# Patient Record
Sex: MALE
[De-identification: names, ages, dates, MRNs, and addresses within clinical notes are randomized per-mention and may not be internally consistent; named-entity substitution may affect disease eponyms.]

---

## 2018-09-20 ENCOUNTER — RX ONLY (RX ONLY)
Age: 58
End: 2018-09-20

## 2018-09-20 RX ORDER — AZITHROMYCIN 250 MG/1
TABLET, FILM COATED ORAL
Qty: 6 | Refills: 1 | Status: ERX | COMMUNITY
Start: 2018-09-20

## 2018-11-07 ENCOUNTER — RX ONLY (RX ONLY)
Age: 58
End: 2018-11-07

## 2018-11-07 RX ORDER — BUDESONIDE/FORMOTEROL FUMARATE 80-4.5 MCG
HFA AEROSOL WITH ADAPTER (GRAM) INHALATION
Qty: 1 | Refills: 11 | Status: ERX | COMMUNITY
Start: 2018-11-07

## 2019-09-19 ENCOUNTER — RX ONLY (RX ONLY)
Age: 59
End: 2019-09-19

## 2019-09-19 RX ORDER — GLYBURIDE 2.5 MG/1
TABLET ORAL
Qty: 90 | Refills: 0 | Status: CANCELLED
Stop reason: CLARIF

## 2019-11-18 ENCOUNTER — RX ONLY (RX ONLY)
Age: 59
End: 2019-11-18

## 2019-11-18 RX ORDER — METFORMIN HYDROCHLORIDE 500 MG/1
TABLET, FILM COATED ORAL
Qty: 120 | Refills: 5 | Status: ERX | COMMUNITY
Start: 2019-11-18

## 2020-02-17 ENCOUNTER — RX ONLY (RX ONLY)
Age: 60
End: 2020-02-17

## 2020-07-13 ENCOUNTER — APPOINTMENT (OUTPATIENT)
Dept: RURAL CLINIC 10 | Age: 60
Setting detail: DERMATOLOGY
End: 2020-07-13

## 2020-07-13 DIAGNOSIS — E11.9 TYPE 2 DIABETES MELLITUS WITHOUT COMPLICATIONS: ICD-10-CM

## 2020-07-13 PROCEDURE — 36415 COLL VENOUS BLD VENIPUNCTURE: CPT

## 2020-07-13 PROCEDURE — OTHER ORDER TESTS: OTHER

## 2020-07-13 PROCEDURE — OTHER VENIPUNCTURE: OTHER

## 2020-07-18 ENCOUNTER — RX ONLY (RX ONLY)
Age: 60
End: 2020-07-18

## 2020-07-18 RX ORDER — GLYBURIDE 2.5 MG/1
TABLET ORAL
Qty: 90 | Refills: 5 | Status: ERX | COMMUNITY
Start: 2020-07-18

## 2020-07-18 RX ORDER — METFORMIN HYDROCHLORIDE 500 MG/1
TABLET, FILM COATED ORAL
Qty: 120 | Refills: 5 | Status: ERX | COMMUNITY
Start: 2020-07-18

## 2020-07-27 ENCOUNTER — RX ONLY (RX ONLY)
Age: 60
End: 2020-07-27

## 2020-07-27 RX ORDER — SULFAMETHOXAZOLE AND TRIMETHOPRIM 800; 160 MG/1; MG/1
TABLET ORAL
Qty: 20 | Refills: 0 | Status: ERX | COMMUNITY
Start: 2020-07-27

## 2020-10-02 ENCOUNTER — RX ONLY (RX ONLY)
Age: 60
End: 2020-10-02

## 2020-10-02 RX ORDER — AMOXICILLIN AND CLAVULANATE POTASSIUM 875; 125 MG/1; 1/1
TABLET, FILM COATED ORAL
Qty: 28 | Refills: 0 | Status: ERX | COMMUNITY
Start: 2020-10-02

## 2021-01-26 ENCOUNTER — RX ONLY (RX ONLY)
Age: 61
End: 2021-01-26

## 2021-01-26 RX ORDER — METFORMIN HYDROCHLORIDE 500 MG/1
TABLET, FILM COATED ORAL
Qty: 120 | Refills: 2 | Status: ERX

## 2021-04-25 ENCOUNTER — RX ONLY (RX ONLY)
Age: 61
End: 2021-04-25

## 2021-04-25 RX ORDER — GLYBURIDE 2.5 MG/1
TABLET ORAL
Qty: 90 | Refills: 0 | Status: ERX

## 2021-04-25 RX ORDER — LISINOPRIL 20 MG/1
TABLET ORAL
Qty: 30 | Refills: 0 | Status: ERX

## 2021-04-26 ENCOUNTER — RX ONLY (RX ONLY)
Age: 61
End: 2021-04-26

## 2021-04-26 RX ORDER — GLYBURIDE 2.5 MG/1
TABLET ORAL
Qty: 90 | Refills: 0 | Status: ERX

## 2021-04-26 RX ORDER — LISINOPRIL 20 MG/1
TABLET ORAL
Qty: 30 | Refills: 0 | Status: ERX | COMMUNITY
Start: 2021-04-26

## 2021-05-13 ENCOUNTER — APPOINTMENT (OUTPATIENT)
Age: 61
Setting detail: DERMATOLOGY
End: 2021-05-13

## 2021-05-13 DIAGNOSIS — E11.9 TYPE 2 DIABETES MELLITUS WITHOUT COMPLICATIONS: ICD-10-CM

## 2021-05-13 PROCEDURE — OTHER ORDER TESTS: OTHER

## 2021-05-13 PROCEDURE — OTHER VENIPUNCTURE: OTHER

## 2021-05-13 NOTE — PROCEDURE: VENIPUNCTURE
Venipuncture Paragraph: An alcohol pad was applied to the venipuncture site. Venipuncture was performed using a butterfly needle. Pressure and a bandaid was applied to the site. No complications were noted.
Detail Level: None
Bill [id number] For Specimen Handling/Conveyance To Laboratory?: no
Number Of Tubes Drawn: 2

## 2021-05-16 ENCOUNTER — RX ONLY (RX ONLY)
Age: 61
End: 2021-05-16

## 2021-05-16 RX ORDER — LISINOPRIL 20 MG/1
TABLET ORAL
Qty: 30 | Refills: 5 | Status: ERX

## 2021-05-16 RX ORDER — GLYBURIDE 2.5 MG/1
TABLET ORAL
Qty: 90 | Refills: 5 | Status: ERX

## 2021-05-16 RX ORDER — METFORMIN HYDROCHLORIDE 500 MG/1
TABLET, FILM COATED ORAL
Qty: 120 | Refills: 5 | Status: ERX

## 2021-06-01 ENCOUNTER — RX ONLY (RX ONLY)
Age: 61
End: 2021-06-01

## 2021-06-01 RX ORDER — AZITHROMYCIN MONOHYDRATE 250 MG/1
TABLET, FILM COATED ORAL
Qty: 6 | Refills: 0 | Status: ERX | COMMUNITY
Start: 2021-06-01

## 2021-06-21 ENCOUNTER — RX ONLY (RX ONLY)
Age: 61
End: 2021-06-21

## 2021-06-21 RX ORDER — LOSARTAN POTASSIUM 50 MG/1
TABLET ORAL
Qty: 30 | Refills: 5 | Status: ERX | COMMUNITY
Start: 2021-06-21

## 2021-08-13 ENCOUNTER — RX ONLY (RX ONLY)
Age: 61
End: 2021-08-13

## 2021-08-13 RX ORDER — AMOXICILLIN 500 MG/1
CAPSULE ORAL
Qty: 20 | Refills: 0 | Status: ERX | COMMUNITY
Start: 2021-08-13